# Patient Record
Sex: FEMALE | Race: BLACK OR AFRICAN AMERICAN | Employment: FULL TIME | URBAN - METROPOLITAN AREA
[De-identification: names, ages, dates, MRNs, and addresses within clinical notes are randomized per-mention and may not be internally consistent; named-entity substitution may affect disease eponyms.]

---

## 2024-08-13 ENCOUNTER — HOSPITAL ENCOUNTER (EMERGENCY)
Age: 44
Discharge: HOME | End: 2024-08-13

## 2024-08-13 VITALS
DIASTOLIC BLOOD PRESSURE: 93 MMHG | HEART RATE: 114 BPM | WEIGHT: 293 LBS | SYSTOLIC BLOOD PRESSURE: 127 MMHG | BODY MASS INDEX: 50.02 KG/M2 | OXYGEN SATURATION: 96 % | TEMPERATURE: 99.3 F | RESPIRATION RATE: 18 BRPM | HEIGHT: 64 IN

## 2024-08-13 DIAGNOSIS — U07.1 COVID-19: Primary | ICD-10-CM

## 2024-08-13 LAB
SARS-COV-2 RDRP RESP QL NAA+PROBE: DETECTED
SOURCE: ABNORMAL

## 2024-08-13 PROCEDURE — 87635 SARS-COV-2 COVID-19 AMP PRB: CPT

## 2024-08-13 PROCEDURE — 99283 EMERGENCY DEPT VISIT LOW MDM: CPT

## 2024-08-13 ASSESSMENT — PAIN - FUNCTIONAL ASSESSMENT: PAIN_FUNCTIONAL_ASSESSMENT: 0-10

## 2024-08-13 NOTE — ED TRIAGE NOTES
Pt states that she might be Covid +.  Pt states she has had a productive cough for a little over a week, chest congestion, and a little nasal congestion.

## 2024-08-14 NOTE — DISCHARGE INSTRUCTIONS
He was this positive for COVID-19.  Isolate accordingly until end of day on Saturday, August 17.  Take over-the-counter medications such as DayQuil, NyQuil, [place], Robitussin, or Mucinex.  Take Motrin or Tylenol as needed for fever relief.  Your symptoms may persist over the next 5 to 7 days.  It is important that you stay hydrated with plenty of clear fluids.

## 2024-08-14 NOTE — ED NOTES
I have reviewed discharge instructions with the patient.  The patient verbalized understanding.    Patient left ED via Discharge Method: ambulatory to Home.    Opportunity for questions and clarification provided.       Patient given 0 scripts.         To continue your aftercare when you leave the hospital, you may receive an automated call from our care team to check in on how you are doing.  This is a free service and part of our promise to provide the best care and service to meet your aftercare needs.” If you have questions, or wish to unsubscribe from this service please call [phone].  Thank you for Choosing our [facility] Emergency Department.

## 2024-08-15 NOTE — ED PROVIDER NOTES
Emergency Department Provider Note       PCP: Kristy Cagle NP-C   Age: 44 y.o.   Sex: female     DISPOSITION Decision To Discharge 08/13/2024 08:49:08 PM  Condition at Disposition: Stable       ICD-10-CM    1. COVID-19  U07.1           Medical Decision Making     Presented with viral URI-like symptoms.  Positive for COVID-19.  Advised conservative symptomatic measures.  O2 saturation 96% on room air, no labored breathing or respiratory distress.       1 acute complicated illness or injury.  Over the counter drug management performed.  Patient was discharged risks and benefits of hospitalization were considered.  Shared medical decision making was utilized in creating the patients health plan today.    I independently ordered and reviewed each unique test.       I interpreted the labs.              History     44-year-old female presenting with concerns of onset of 1 day history of fatigue, cough, mucus production with rhinorrhea, chest congestion.  Denies fevers.  Concern for exposure to COVID-19.    The history is provided by the patient.     Physical Exam     Vitals signs and nursing note reviewed:  Vitals:    08/13/24 1955   BP: (!) 127/93   Pulse: (!) 114   Resp: 18   Temp: 99.3 °F (37.4 °C)   TempSrc: Oral   SpO2: 96%   Weight: (!) 163.3 kg (360 lb)   Height: 1.626 m (5' 4\")      Physical Exam  Vitals reviewed.   Constitutional:       General: She is not in acute distress.     Appearance: Normal appearance. She is normal weight. She is not ill-appearing.   HENT:      Head: Normocephalic and atraumatic.   Eyes:      Extraocular Movements: Extraocular movements intact.      Pupils: Pupils are equal, round, and reactive to light.   Cardiovascular:      Rate and Rhythm: Tachycardia present.   Pulmonary:      Effort: Pulmonary effort is normal. No respiratory distress.   Musculoskeletal:         General: Normal range of motion.   Neurological:      Mental Status: She is alert.        Procedures

## 2025-03-27 ENCOUNTER — HOSPITAL ENCOUNTER (EMERGENCY)
Age: 45
Discharge: HOME | End: 2025-03-27

## 2025-03-27 VITALS
WEIGHT: 293 LBS | DIASTOLIC BLOOD PRESSURE: 100 MMHG | BODY MASS INDEX: 50.02 KG/M2 | OXYGEN SATURATION: 96 % | HEIGHT: 64 IN | RESPIRATION RATE: 18 BRPM | HEART RATE: 88 BPM | SYSTOLIC BLOOD PRESSURE: 178 MMHG | TEMPERATURE: 97.8 F

## 2025-03-27 DIAGNOSIS — M43.6 TORTICOLLIS: Primary | ICD-10-CM

## 2025-03-27 PROCEDURE — 99283 EMERGENCY DEPT VISIT LOW MDM: CPT

## 2025-03-27 RX ORDER — METHOCARBAMOL 750 MG/1
750 TABLET, FILM COATED ORAL 3 TIMES DAILY
Qty: 21 TABLET | Refills: 0 | Status: SHIPPED | OUTPATIENT
Start: 2025-03-27 | End: 2025-04-03

## 2025-03-27 RX ORDER — LIDOCAINE 50 MG/G
1 PATCH TOPICAL DAILY
Qty: 10 PATCH | Refills: 0 | Status: SHIPPED | OUTPATIENT
Start: 2025-03-27 | End: 2025-04-06

## 2025-03-27 ASSESSMENT — PAIN DESCRIPTION - ORIENTATION: ORIENTATION: RIGHT

## 2025-03-27 ASSESSMENT — PAIN - FUNCTIONAL ASSESSMENT
PAIN_FUNCTIONAL_ASSESSMENT: 0-10
PAIN_FUNCTIONAL_ASSESSMENT: 0-10

## 2025-03-27 ASSESSMENT — PAIN SCALES - GENERAL
PAINLEVEL_OUTOF10: 5
PAINLEVEL_OUTOF10: 5

## 2025-03-27 ASSESSMENT — PAIN DESCRIPTION - LOCATION: LOCATION: NECK

## 2025-03-27 ASSESSMENT — LIFESTYLE VARIABLES
HOW MANY STANDARD DRINKS CONTAINING ALCOHOL DO YOU HAVE ON A TYPICAL DAY: PATIENT DOES NOT DRINK
HOW OFTEN DO YOU HAVE A DRINK CONTAINING ALCOHOL: NEVER

## 2025-03-27 NOTE — ED PROVIDER NOTES
Emergency Department Provider Note       PCP: Kristy Cagle NP-C   Age: 45 y.o.   Sex: female     DISPOSITION Decision To Discharge 03/27/2025 05:01:12 PM   DISPOSITION CONDITION Stable            ICD-10-CM    1. Torticollis  M43.6           Medical Decision Making     Patient presents to the ER with complaint of right neck pain that started 3 days ago.  Patient does have muscle spasms in the right paraspinal muscles which are tender to palpation.  Discussed muscle relaxers, lidocaine patches and taking Tylenol.  Offered patient a CT scan of her neck but she declines at this time.  She has not had any falls or injuries.  States she would like to try the prescription medication.  Discussed follow-up.  Patient verbalized understanding and agrees with plan.     1 acute complicated illness or injury.  Prescription drug management performed.  Shared medical decision making was utilized in creating the patients health plan today.  I independently ordered and reviewed each unique test.    I reviewed external records: provider visit note from PCP.                     History     45-year-old female presents to the ER with complaint of right neck pain that started 3 days ago.  Patient states she does not remember doing anything or waking up with the pain.  Patient is unsure if she slept wrong that night because she does sleep on her stomach.  Patient states when she turns her head to the right she has a lot of pain in that area.  States she has been taking over-the-counter medications but they only helped for an hour or 2, occasionally 3 hours.  Patient denies any falls or injuries.  Denies other symptoms.    The history is provided by the patient.     Physical Exam     Vitals signs and nursing note reviewed:  Vitals:    03/27/25 1559 03/27/25 1723   BP: (!) 181/110 (!) 178/100   Pulse: 98 88   Resp: 18 18   Temp: 97.8 °F (36.6 °C)    TempSrc: Oral    SpO2: 97% 96%   Weight: (!) 163.3 kg (360 lb)    Height: 1.626 m (5' 4\")

## 2025-03-27 NOTE — DISCHARGE INSTRUCTIONS
Use over-the-counter Voltaren gel to help with pain.    Take medication as prescribed. Do not drive while taking muscle relaxers as they can cause drowsiness.  You may also take Tylenol every 6-8 hours as needed for pain.    Warm and cold compresses to sore area for 15-20 minutes several times/day.    Drink at least 8-10 cups of water a day.    Call, make an appointment and follow up with your doctor. Return to ER for worsening symptoms or other concerns.

## 2025-03-27 NOTE — ED TRIAGE NOTES
Patient is having neck pain at the base of the skull on the right side. The pain started 3 days ago. She has been taking OTC medication (Goody's powder, Advil, Excedrin Migraine) but it is not helping for very long.

## 2025-05-23 ENCOUNTER — HOSPITAL ENCOUNTER (EMERGENCY)
Age: 45
Discharge: HOME | End: 2025-05-23
Attending: EMERGENCY MEDICINE
Payer: OTHER MISCELLANEOUS

## 2025-05-23 VITALS
BODY MASS INDEX: 48.82 KG/M2 | HEIGHT: 65 IN | SYSTOLIC BLOOD PRESSURE: 164 MMHG | OXYGEN SATURATION: 97 % | TEMPERATURE: 97.6 F | HEART RATE: 79 BPM | DIASTOLIC BLOOD PRESSURE: 102 MMHG | RESPIRATION RATE: 20 BRPM | WEIGHT: 293 LBS

## 2025-05-23 DIAGNOSIS — M79.18 MUSCULOSKELETAL PAIN: ICD-10-CM
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
## 2025-05-23 PROCEDURE — 99283 EMERGENCY DEPT VISIT LOW MDM: CPT

## 2025-05-23 RX ORDER — METHOCARBAMOL 500 MG/1
500-1500 TABLET, FILM COATED ORAL 3 TIMES DAILY
Qty: 45 TABLET | Refills: 0 | Status: SHIPPED | OUTPATIENT
Start: 2025-05-23 | End: 2025-05-28

## 2025-05-23 ASSESSMENT — ENCOUNTER SYMPTOMS
WHEEZING: 0
ABDOMINAL PAIN: 0
FACIAL SWELLING: 0
COLOR CHANGE: 0
SHORTNESS OF BREATH: 0
EYE PAIN: 0
STRIDOR: 0
BACK PAIN: 1
TROUBLE SWALLOWING: 0

## 2025-05-23 ASSESSMENT — LIFESTYLE VARIABLES
HOW OFTEN DO YOU HAVE A DRINK CONTAINING ALCOHOL: NEVER
HOW MANY STANDARD DRINKS CONTAINING ALCOHOL DO YOU HAVE ON A TYPICAL DAY: PATIENT DOES NOT DRINK

## 2025-05-23 ASSESSMENT — PAIN DESCRIPTION - LOCATION: LOCATION: SHOULDER;NECK

## 2025-05-23 ASSESSMENT — PAIN SCALES - GENERAL: PAINLEVEL_OUTOF10: 5

## 2025-05-23 ASSESSMENT — PAIN - FUNCTIONAL ASSESSMENT: PAIN_FUNCTIONAL_ASSESSMENT: 0-10
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
